# Patient Record
Sex: MALE | Race: WHITE | NOT HISPANIC OR LATINO
[De-identification: names, ages, dates, MRNs, and addresses within clinical notes are randomized per-mention and may not be internally consistent; named-entity substitution may affect disease eponyms.]

---
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
## 2023-02-23 ENCOUNTER — APPOINTMENT (OUTPATIENT)
Dept: PEDIATRICS | Facility: CLINIC | Age: 18
End: 2023-02-23
Payer: COMMERCIAL

## 2023-02-23 VITALS
DIASTOLIC BLOOD PRESSURE: 70 MMHG | BODY MASS INDEX: 23.09 KG/M2 | WEIGHT: 140.31 LBS | HEIGHT: 65.25 IN | SYSTOLIC BLOOD PRESSURE: 118 MMHG | TEMPERATURE: 98.1 F | HEART RATE: 60 BPM | RESPIRATION RATE: 12 BRPM

## 2023-02-23 PROCEDURE — 99395 PREV VISIT EST AGE 18-39: CPT

## 2023-02-23 PROCEDURE — 96160 PT-FOCUSED HLTH RISK ASSMT: CPT | Mod: 59

## 2023-02-23 PROCEDURE — 96127 BRIEF EMOTIONAL/BEHAV ASSMT: CPT

## 2023-02-23 RX ORDER — CIPROFLOXACIN AND DEXAMETHASONE 3; 1 MG/ML; MG/ML
0.3-0.1 SUSPENSION/ DROPS AURICULAR (OTIC)
Qty: 1 | Refills: 1 | Status: COMPLETED | COMMUNITY
Start: 2022-12-02 | End: 2022-12-09

## 2023-02-23 RX ORDER — DIAZEPAM 5 MG/1
5 TABLET ORAL
Qty: 15 | Refills: 0 | Status: COMPLETED | COMMUNITY
Start: 2023-02-13 | End: 2023-02-15

## 2023-02-23 RX ORDER — AMOXICILLIN AND CLAVULANATE POTASSIUM 875; 125 MG/1; MG/1
875-125 TABLET, COATED ORAL TWICE DAILY
Qty: 20 | Refills: 0 | Status: COMPLETED | COMMUNITY
Start: 2022-12-02 | End: 2022-12-12

## 2023-02-23 NOTE — PHYSICAL EXAM
[Alert] : alert [No Acute Distress] : no acute distress [Normocephalic] : normocephalic [EOMI Bilateral] : EOMI bilateral [Pink Nasal Mucosa] : pink nasal mucosa [Nonerythematous Oropharynx] : nonerythematous oropharynx [Supple, full passive range of motion] : supple, full passive range of motion [No Palpable Masses] : no palpable masses [Clear to Auscultation Bilaterally] : clear to auscultation bilaterally [Regular Rate and Rhythm] : regular rate and rhythm [Normal S1, S2 audible] : normal S1, S2 audible [No Murmurs] : no murmurs [+2 Femoral Pulses] : +2 femoral pulses [Soft] : soft [NonTender] : non tender [Non Distended] : non distended [Normoactive Bowel Sounds] : normoactive bowel sounds [No Hepatomegaly] : no hepatomegaly [No Splenomegaly] : no splenomegaly [No Abnormal Lymph Nodes Palpated] : no abnormal lymph nodes palpated [Normal Muscle Tone] : normal muscle tone [No Gait Asymmetry] : no gait asymmetry [No pain or deformities with palpation of bone, muscles, joints] : no pain or deformities with palpation of bone, muscles, joints [Straight] : straight [+2 Patella DTR] : +2 patella DTR [Cranial Nerves Grossly Intact] : cranial nerves grossly intact [No Rash or Lesions] : no rash or lesions [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

## 2023-03-14 LAB
ALBUMIN SERPL ELPH-MCNC: 4.9 G/DL
ALP BLD-CCNC: 87 U/L
ALT SERPL-CCNC: 18 U/L
ANION GAP SERPL CALC-SCNC: 9 MMOL/L
AST SERPL-CCNC: 19 U/L
BASOPHILS # BLD AUTO: 0.07 K/UL
BASOPHILS NFR BLD AUTO: 0.6 %
BILIRUB SERPL-MCNC: 1.2 MG/DL
BUN SERPL-MCNC: 9 MG/DL
CALCIUM SERPL-MCNC: 10.1 MG/DL
CHLORIDE SERPL-SCNC: 104 MMOL/L
CHOLEST SERPL-MCNC: 148 MG/DL
CO2 SERPL-SCNC: 28 MMOL/L
CREAT SERPL-MCNC: 0.77 MG/DL
EGFR: 132 ML/MIN/1.73M2
EOSINOPHIL # BLD AUTO: 0.64 K/UL
EOSINOPHIL NFR BLD AUTO: 5.7 %
GLUCOSE SERPL-MCNC: 89 MG/DL
HCT VFR BLD CALC: 47.5 %
HDLC SERPL-MCNC: 34 MG/DL
HGB BLD-MCNC: 15.8 G/DL
IMM GRANULOCYTES NFR BLD AUTO: 0.7 %
LDLC SERPL CALC-MCNC: 89 MG/DL
LYMPHOCYTES # BLD AUTO: 3.66 K/UL
LYMPHOCYTES NFR BLD AUTO: 32.5 %
MAN DIFF?: NORMAL
MCHC RBC-ENTMCNC: 29.6 PG
MCHC RBC-ENTMCNC: 33.3 GM/DL
MCV RBC AUTO: 89 FL
MONOCYTES # BLD AUTO: 1 K/UL
MONOCYTES NFR BLD AUTO: 8.9 %
NEUTROPHILS # BLD AUTO: 5.8 K/UL
NEUTROPHILS NFR BLD AUTO: 51.6 %
NONHDLC SERPL-MCNC: 114 MG/DL
PLATELET # BLD AUTO: 292 K/UL
POTASSIUM SERPL-SCNC: 4.7 MMOL/L
PROT SERPL-MCNC: 7.6 G/DL
RBC # BLD: 5.34 M/UL
RBC # FLD: 12.1 %
SODIUM SERPL-SCNC: 141 MMOL/L
TRIGL SERPL-MCNC: 128 MG/DL
WBC # FLD AUTO: 11.25 K/UL

## 2023-03-15 ENCOUNTER — APPOINTMENT (OUTPATIENT)
Dept: PEDIATRIC CARDIOLOGY | Facility: CLINIC | Age: 18
End: 2023-03-15
Payer: COMMERCIAL

## 2023-03-15 ENCOUNTER — RESULT CHARGE (OUTPATIENT)
Age: 18
End: 2023-03-15

## 2023-03-15 VITALS
OXYGEN SATURATION: 100 % | HEIGHT: 64.96 IN | DIASTOLIC BLOOD PRESSURE: 69 MMHG | SYSTOLIC BLOOD PRESSURE: 113 MMHG | BODY MASS INDEX: 23.54 KG/M2 | RESPIRATION RATE: 20 BRPM | WEIGHT: 141.32 LBS | HEART RATE: 61 BPM

## 2023-03-15 DIAGNOSIS — Z01.810 ENCOUNTER FOR PREPROCEDURAL CARDIOVASCULAR EXAMINATION: ICD-10-CM

## 2023-03-15 DIAGNOSIS — Z87.898 PERSONAL HISTORY OF OTHER SPECIFIED CONDITIONS: ICD-10-CM

## 2023-03-15 PROCEDURE — 99204 OFFICE O/P NEW MOD 45 MIN: CPT

## 2023-03-15 PROCEDURE — 93325 DOPPLER ECHO COLOR FLOW MAPG: CPT

## 2023-03-15 PROCEDURE — 99214 OFFICE O/P EST MOD 30 MIN: CPT

## 2023-03-15 PROCEDURE — 93000 ELECTROCARDIOGRAM COMPLETE: CPT

## 2023-03-15 PROCEDURE — 93303 ECHO TRANSTHORACIC: CPT

## 2023-03-15 PROCEDURE — 93320 DOPPLER ECHO COMPLETE: CPT

## 2023-03-16 PROBLEM — Z87.898 HISTORY OF FATIGUE: Status: RESOLVED | Noted: 2021-09-22 | Resolved: 2023-03-16

## 2023-03-16 NOTE — CONSULT LETTER
[Today's Date] : [unfilled] [Name] : Name: [unfilled] [] : : ~~ [Today's Date:] : [unfilled] [Dear  ___:] : Dear Dr. [unfilled]: [Consult] : I had the pleasure of evaluating your patient, [unfilled]. My full evaluation follows. [Consult - Single Provider] : Thank you very much for allowing me to participate in the care of this patient. If you have any questions, please do not hesitate to contact me. [Sincerely,] : Sincerely, [FreeTextEntry4] : Jacky Barnard MD [FreeTextEntry5] : [address] [de-identified] : Yousuf Navarro DO,MPH\par Pediatric Cardiologist\par [facility]\par  [FreeTextEntry6] : [place] [address]

## 2023-03-16 NOTE — REASON FOR VISIT
[Follow-Up] : a follow-up visit for [Coarctation Of The Aorta] : coarctation of the aorta [Patient] : patient [Mother] : mother [FreeTextEntry3] : Pre-surgical clearance

## 2023-03-16 NOTE — PHYSICAL EXAM
[General Appearance - Alert] : alert [General Appearance - In No Acute Distress] : in no acute distress [General Appearance - Well Nourished] : well nourished [General Appearance - Well Developed] : well developed [General Appearance - Well-Appearing] : well appearing [Appearance Of Head] : the head was normocephalic [Facies] : there were no dysmorphic facial features [Sclera] : the conjunctiva were normal [PERRL With Normal Accommodation] : the pupils were equal in size, round, and reactive to light [Outer Ear] : the ears and nose were normal in appearance [Respiration, Rhythm And Depth] : normal respiratory rhythm and effort [Auscultation Breath Sounds / Voice Sounds] : breath sounds clear to auscultation bilaterally [No Cough] : no cough [Stridor] : no stridor was observed [Normal Chest Appearance] : the chest was normal in appearance [Apical Impulse] : quiet precordium with normal apical impulse [Heart Rate And Rhythm] : normal heart rate and rhythm [Heart Sounds] : normal S1 and S2 [Heart Sounds Gallop] : no gallops [Heart Sounds Pericardial Friction Rub] : no pericardial rub [Heart Sounds Click] : no clicks [Arterial Pulses] : normal upper and lower extremity pulses with no pulse delay [Edema] : no edema [Capillary Refill Test] : normal capillary refill [Bowel Sounds] : normal bowel sounds [Abdomen Soft] : soft [Nondistended] : nondistended [Abdomen Tenderness] : non-tender [Nail Clubbing] : no clubbing  or cyanosis of the fingers [Motor Tone] : normal muscle strength and tone [Cervical Lymph Nodes Enlarged Anterior] : The anterior cervical nodes were normal [Skin Lesions] : no lesions [Skin Turgor] : normal turgor [Skin Color & Pigmentation] : normal skin color and pigmentation [Demonstrated Behavior - Infant Nonreactive To Parents] : interactive [Mood] : mood and affect were appropriate for age [Demonstrated Behavior] : normal behavior [Systolic] : systolic [II] : a grade 2/6 [LLSB] : LLSB  [LMSB] : LMSB  [Vibratory] : vibratory [Early] : early [] : increases when supine [FreeTextEntry1] : Femoral Pulse +2 B/L; Posterior tibial pulse +2 B/L

## 2023-03-16 NOTE — CARDIOLOGY SUMMARY
[LVSF ___%] : LV Shortening Fraction [unfilled]% [de-identified] : 3/16/23 [FreeTextEntry1] : Ectopic atrial rhythm (likely low atrial) @ 65 bpm\par SD: 158 ms QRS: 106 ms  QTc: 403 ms\par P-R-T Axis (-14 119 29)\par Right axis deviation\par Normal voltage and intervals\par No ST segment abnormalities\par No pre-excitation \par  [de-identified] : 3/16/23 [FreeTextEntry2] : Summary:\par 1. Status post balloon angioplasty of the descending aorta.\par 2. Mild acceleration of flow velocity in descending aorta with peak systolic gradient about 22 mmHg and mean gradient 16 mmHg.\par 3. Very mild buckling of the anterior mitral valve leaflet; trace regurgitation. No stenosis\par 4. 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 Physiologic tricuspid valve regurgitation\par 6. Trivial pulmonary valve regurgitation.\par 7. Normal biventricular size and systolic function\par 8. No pericardial effusion.\par  [de-identified] : 8/19/2018 [de-identified] : Normal left ventricular volume, LVEDVi = 76.4ml/m^2, z=-0.4, and normal right ventricular volume, \par RVEDVi = 96.8ml/m^2, z = 0.75. Normal left ventricular and right ventricular ejection fractions. Normal right and left atria. No evidence of mitral and tricuspid regurgitation on SSFP imaging. \par \par Tricommisural aortic valve. Normal aortic root with the largest dimension of 3.74cm, z = +3.58. Left \par aortic arch with normal branching pattern. Normal ascending aorta dimensions with mean \par measurement 1.84 cm, z-score of -1.84. Distal transverse arch mean measurement is 1.38cm, \par z=1.23. The proximal descending aorta caliber measures 1.16x1.29cm and then distal to this it dilates to 1.59x1.35cm but there is no discrete obstruction. No collaterals from the aorta seen. [de-identified] : 8/16/2018 [de-identified] : Head MRA\par Findings: MRA of the brain demonstrates no abnormalities. No stenosis, occlusion, vascular \par malformation or aneurysm is seen. The right vertebral artery is dominant.

## 2023-09-20 ENCOUNTER — APPOINTMENT (OUTPATIENT)
Dept: PEDIATRIC CARDIOLOGY | Facility: CLINIC | Age: 18
End: 2023-09-20

## 2023-11-27 ENCOUNTER — APPOINTMENT (OUTPATIENT)
Dept: PEDIATRIC CARDIOLOGY | Facility: CLINIC | Age: 18
End: 2023-11-27
Payer: COMMERCIAL

## 2023-11-27 VITALS
OXYGEN SATURATION: 99 % | HEIGHT: 65.55 IN | RESPIRATION RATE: 20 BRPM | WEIGHT: 141.76 LBS | SYSTOLIC BLOOD PRESSURE: 119 MMHG | HEART RATE: 57 BPM | BODY MASS INDEX: 23.33 KG/M2 | DIASTOLIC BLOOD PRESSURE: 72 MMHG

## 2023-11-27 DIAGNOSIS — I49.1 ATRIAL PREMATURE DEPOLARIZATION: ICD-10-CM

## 2023-11-27 DIAGNOSIS — Q25.1 COARCTATION OF AORTA: ICD-10-CM

## 2023-11-27 PROCEDURE — 93325 DOPPLER ECHO COLOR FLOW MAPG: CPT

## 2023-11-27 PROCEDURE — 93320 DOPPLER ECHO COMPLETE: CPT

## 2023-11-27 PROCEDURE — 93000 ELECTROCARDIOGRAM COMPLETE: CPT

## 2023-11-27 PROCEDURE — 99215 OFFICE O/P EST HI 40 MIN: CPT | Mod: 25

## 2023-11-27 PROCEDURE — 93303 ECHO TRANSTHORACIC: CPT

## 2024-02-21 ENCOUNTER — OFFICE (OUTPATIENT)
Dept: URBAN - METROPOLITAN AREA CLINIC 109 | Facility: CLINIC | Age: 19
Setting detail: OPHTHALMOLOGY
End: 2024-02-21
Payer: COMMERCIAL

## 2024-02-21 DIAGNOSIS — H02.822: ICD-10-CM

## 2024-02-21 DIAGNOSIS — H10.45: ICD-10-CM

## 2024-02-21 PROCEDURE — 92004 COMPRE OPH EXAM NEW PT 1/>: CPT | Performed by: OPHTHALMOLOGY

## 2024-02-21 ASSESSMENT — CONFRONTATIONAL VISUAL FIELD TEST (CVF)
OS_FINDINGS: FULL
OD_FINDINGS: FULL

## 2024-02-21 ASSESSMENT — REFRACTION_AUTOREFRACTION
OD_CYLINDER: -0.25
OD_AXIS: 93
OS_AXIS: 48
OS_SPHERE: +0.25
OS_CYLINDER: -0.25
OD_SPHERE: 0.00

## 2024-02-21 ASSESSMENT — SPHEQUIV_DERIVED
OD_SPHEQUIV: -0.125
OS_SPHEQUIV: 0.125

## 2024-04-03 ENCOUNTER — APPOINTMENT (OUTPATIENT)
Dept: PEDIATRICS | Facility: CLINIC | Age: 19
End: 2024-04-03
Payer: COMMERCIAL

## 2024-04-03 VITALS
BODY MASS INDEX: 22.98 KG/M2 | HEIGHT: 65.75 IN | SYSTOLIC BLOOD PRESSURE: 124 MMHG | HEART RATE: 64 BPM | DIASTOLIC BLOOD PRESSURE: 74 MMHG | WEIGHT: 141.3 LBS | TEMPERATURE: 97.2 F | RESPIRATION RATE: 17 BRPM

## 2024-04-03 DIAGNOSIS — Z23 ENCOUNTER FOR IMMUNIZATION: ICD-10-CM

## 2024-04-03 DIAGNOSIS — Z00.00 ENCOUNTER FOR GENERAL ADULT MEDICAL EXAMINATION W/OUT ABNORMAL FINDINGS: ICD-10-CM

## 2024-04-03 PROCEDURE — 96127 BRIEF EMOTIONAL/BEHAV ASSMT: CPT

## 2024-04-03 PROCEDURE — 90472 IMMUNIZATION ADMIN EACH ADD: CPT

## 2024-04-03 PROCEDURE — 96160 PT-FOCUSED HLTH RISK ASSMT: CPT | Mod: 59

## 2024-04-03 PROCEDURE — 90471 IMMUNIZATION ADMIN: CPT

## 2024-04-03 PROCEDURE — 99395 PREV VISIT EST AGE 18-39: CPT | Mod: 25

## 2024-04-03 PROCEDURE — 90621 MENB-FHBP VACC 2/3 DOSE IM: CPT

## 2024-04-03 PROCEDURE — 90715 TDAP VACCINE 7 YRS/> IM: CPT

## 2024-04-03 RX ORDER — TRIAMCINOLONE ACETONIDE 1 MG/G
0.1 OINTMENT TOPICAL
Qty: 1 | Refills: 3 | Status: COMPLETED | COMMUNITY
Start: 2023-07-14 | End: 2024-04-03

## 2024-04-03 RX ORDER — MOMETASONE FUROATE 1 MG/G
0.1 CREAM TOPICAL
Qty: 2 | Refills: 11 | Status: COMPLETED | COMMUNITY
Start: 2022-12-02 | End: 2024-04-03

## 2024-04-03 NOTE — RISK ASSESSMENT
[PHQ-9 Negative - No further assessment needed] : PHQ-9 Negative - No further assessment needed [No Increased risk of SCA or SCD] : No Increased risk of SCA or SCD

## 2024-04-03 NOTE — PHYSICAL EXAM
[Alert] : alert [No Acute Distress] : no acute distress [Normocephalic] : normocephalic [Clear tympanic membranes with bony landmarks and light reflex present bilaterally] : clear tympanic membranes with bony landmarks and light reflex present bilaterally  [EOMI Bilateral] : EOMI bilateral [Nonerythematous Oropharynx] : nonerythematous oropharynx [Pink Nasal Mucosa] : pink nasal mucosa [Supple, full passive range of motion] : supple, full passive range of motion [Clear to Auscultation Bilaterally] : clear to auscultation bilaterally [No Palpable Masses] : no palpable masses [Regular Rate and Rhythm] : regular rate and rhythm [No Murmurs] : no murmurs [Normal S1, S2 audible] : normal S1, S2 audible [+2 Femoral Pulses] : +2 femoral pulses [Soft] : soft [Non Distended] : non distended [NonTender] : non tender [Normoactive Bowel Sounds] : normoactive bowel sounds [No Hepatomegaly] : no hepatomegaly [No Splenomegaly] : no splenomegaly [No Abnormal Lymph Nodes Palpated] : no abnormal lymph nodes palpated [Normal Muscle Tone] : normal muscle tone [No pain or deformities with palpation of bone, muscles, joints] : no pain or deformities with palpation of bone, muscles, joints [No Gait Asymmetry] : no gait asymmetry [Straight] : straight [+2 Patella DTR] : +2 patella DTR [Cranial Nerves Grossly Intact] : cranial nerves grossly intact [No Rash or Lesions] : no rash or lesions

## 2024-07-11 ENCOUNTER — APPOINTMENT (OUTPATIENT)
Dept: CT IMAGING | Facility: CLINIC | Age: 19
End: 2024-07-11
Payer: COMMERCIAL

## 2024-07-11 ENCOUNTER — OUTPATIENT (OUTPATIENT)
Dept: OUTPATIENT SERVICES | Facility: HOSPITAL | Age: 19
LOS: 1 days | End: 2024-07-11
Payer: COMMERCIAL

## 2024-07-11 DIAGNOSIS — Z90.89 ACQUIRED ABSENCE OF OTHER ORGANS: Chronic | ICD-10-CM

## 2024-07-11 DIAGNOSIS — Z00.8 ENCOUNTER FOR OTHER GENERAL EXAMINATION: ICD-10-CM

## 2024-07-11 DIAGNOSIS — Q25.1 COARCTATION OF AORTA: Chronic | ICD-10-CM

## 2024-07-11 DIAGNOSIS — Z98.890 OTHER SPECIFIED POSTPROCEDURAL STATES: Chronic | ICD-10-CM

## 2024-07-11 PROCEDURE — 70480 CT ORBIT/EAR/FOSSA W/O DYE: CPT

## 2024-07-11 PROCEDURE — 70480 CT ORBIT/EAR/FOSSA W/O DYE: CPT | Mod: 26

## 2024-08-27 NOTE — DISCUSSION/SUMMARY
Detail Level: Detailed Size Of Lesion In Cm (Optional): 0 Morphology Per Location (Optional): Shiny erythematous thin papule with comma vessels and pseudohorn cysts - SK favored; low threshold to biopsy to r/o BCC [PE + No Restrictions] : [unfilled] may participate in the entire physical education program without restriction, including all varsity competitive sports. [Influenza vaccine is recommended] : Influenza vaccine is recommended [Needs SBE Prophylaxis] : [unfilled] does not need bacterial endocarditis prophylaxis [FreeTextEntry1] : \par \par

## 2024-11-27 ENCOUNTER — APPOINTMENT (OUTPATIENT)
Dept: PEDIATRIC CARDIOLOGY | Facility: CLINIC | Age: 19
End: 2024-11-27

## 2024-12-03 DIAGNOSIS — Z01.818 ENCOUNTER FOR OTHER PREPROCEDURAL EXAMINATION: ICD-10-CM

## 2024-12-11 ENCOUNTER — LABORATORY RESULT (OUTPATIENT)
Age: 19
End: 2024-12-11

## 2024-12-15 LAB
ALBUMIN SERPL ELPH-MCNC: 4.8 G/DL
ALP BLD-CCNC: 66 U/L
ALT SERPL-CCNC: 12 U/L
ANION GAP SERPL CALC-SCNC: 11 MMOL/L
APTT BLD: 32.6 SEC
AST SERPL-CCNC: 17 U/L
BASOPHILS # BLD AUTO: 0.06 K/UL
BASOPHILS NFR BLD AUTO: 0.6 %
BILIRUB SERPL-MCNC: 1.2 MG/DL
BUN SERPL-MCNC: 17 MG/DL
CALCIUM SERPL-MCNC: 10.3 MG/DL
CHLORIDE SERPL-SCNC: 102 MMOL/L
CO2 SERPL-SCNC: 26 MMOL/L
CREAT SERPL-MCNC: 0.81 MG/DL
EGFR: 129 ML/MIN/1.73M2
EOSINOPHIL # BLD AUTO: 0.57 K/UL
EOSINOPHIL NFR BLD AUTO: 5.3 %
GLUCOSE SERPL-MCNC: 80 MG/DL
HCT VFR BLD CALC: 45.3 %
HGB BLD-MCNC: 15.3 G/DL
IMM GRANULOCYTES NFR BLD AUTO: 0.3 %
LYMPHOCYTES # BLD AUTO: 3.37 K/UL
LYMPHOCYTES NFR BLD AUTO: 31.5 %
MAN DIFF?: NORMAL
MCHC RBC-ENTMCNC: 29.8 PG
MCHC RBC-ENTMCNC: 33.8 G/DL
MCV RBC AUTO: 88.1 FL
MONOCYTES # BLD AUTO: 0.98 K/UL
MONOCYTES NFR BLD AUTO: 9.2 %
NEUTROPHILS # BLD AUTO: 5.7 K/UL
NEUTROPHILS NFR BLD AUTO: 53.1 %
PLATELET # BLD AUTO: 258 K/UL
POTASSIUM SERPL-SCNC: 4.8 MMOL/L
PROT SERPL-MCNC: 7.4 G/DL
RBC # BLD: 5.14 M/UL
RBC # FLD: 12 %
SODIUM SERPL-SCNC: 139 MMOL/L
WBC # FLD AUTO: 10.71 K/UL

## 2024-12-23 ENCOUNTER — APPOINTMENT (OUTPATIENT)
Dept: PEDIATRICS | Facility: CLINIC | Age: 19
End: 2024-12-23
Payer: COMMERCIAL

## 2024-12-23 DIAGNOSIS — Z01.818 ENCOUNTER FOR OTHER PREPROCEDURAL EXAMINATION: ICD-10-CM

## 2024-12-23 PROCEDURE — 99214 OFFICE O/P EST MOD 30 MIN: CPT

## 2024-12-26 ENCOUNTER — APPOINTMENT (OUTPATIENT)
Dept: CARDIOLOGY | Facility: CLINIC | Age: 19
End: 2024-12-26
Payer: COMMERCIAL

## 2024-12-26 ENCOUNTER — NON-APPOINTMENT (OUTPATIENT)
Age: 19
End: 2024-12-26

## 2024-12-26 VITALS
WEIGHT: 140 LBS | HEIGHT: 65 IN | SYSTOLIC BLOOD PRESSURE: 107 MMHG | HEART RATE: 58 BPM | BODY MASS INDEX: 23.32 KG/M2 | DIASTOLIC BLOOD PRESSURE: 66 MMHG | OXYGEN SATURATION: 100 %

## 2024-12-26 VITALS — SYSTOLIC BLOOD PRESSURE: 110 MMHG | DIASTOLIC BLOOD PRESSURE: 69 MMHG

## 2024-12-26 DIAGNOSIS — Q25.1 COARCTATION OF AORTA: ICD-10-CM

## 2024-12-26 PROCEDURE — 93000 ELECTROCARDIOGRAM COMPLETE: CPT

## 2024-12-26 PROCEDURE — 99204 OFFICE O/P NEW MOD 45 MIN: CPT | Mod: 25

## 2024-12-26 PROCEDURE — 99214 OFFICE O/P EST MOD 30 MIN: CPT | Mod: 25

## 2025-03-19 ENCOUNTER — APPOINTMENT (OUTPATIENT)
Dept: PEDIATRIC CARDIOLOGY | Facility: CLINIC | Age: 20
End: 2025-03-19

## 2025-06-20 ENCOUNTER — APPOINTMENT (OUTPATIENT)
Dept: PEDIATRICS | Facility: CLINIC | Age: 20
End: 2025-06-20
Payer: COMMERCIAL

## 2025-06-20 VITALS
RESPIRATION RATE: 16 BRPM | WEIGHT: 136.56 LBS | HEIGHT: 65.35 IN | BODY MASS INDEX: 22.48 KG/M2 | DIASTOLIC BLOOD PRESSURE: 68 MMHG | HEART RATE: 90 BPM | SYSTOLIC BLOOD PRESSURE: 112 MMHG

## 2025-06-20 PROCEDURE — 96160 PT-FOCUSED HLTH RISK ASSMT: CPT | Mod: 59

## 2025-06-20 PROCEDURE — 99395 PREV VISIT EST AGE 18-39: CPT

## 2025-06-20 PROCEDURE — 96127 BRIEF EMOTIONAL/BEHAV ASSMT: CPT
